# Patient Record
Sex: FEMALE | Race: WHITE
[De-identification: names, ages, dates, MRNs, and addresses within clinical notes are randomized per-mention and may not be internally consistent; named-entity substitution may affect disease eponyms.]

---

## 2017-01-02 ENCOUNTER — HOSPITAL ENCOUNTER (EMERGENCY)
Age: 62
Discharge: HOME | End: 2017-01-02

## 2017-07-28 ENCOUNTER — HOSPITAL ENCOUNTER (EMERGENCY)
Dept: HOSPITAL 76 - ED | Age: 62
Discharge: HOME | End: 2017-07-28
Payer: COMMERCIAL

## 2017-07-28 VITALS — SYSTOLIC BLOOD PRESSURE: 155 MMHG | DIASTOLIC BLOOD PRESSURE: 85 MMHG

## 2017-07-28 DIAGNOSIS — M75.102: Primary | ICD-10-CM
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
## 2017-07-28 PROCEDURE — 99281 EMR DPT VST MAYX REQ PHY/QHP: CPT

## 2017-07-28 PROCEDURE — 99283 EMERGENCY DEPT VISIT LOW MDM: CPT

## 2017-07-28 NOTE — ED PHYSICIAN DOCUMENTATION
PD HPI UPPER EXT INJURY





- Stated complaint


Stated Complaint: BACK PX/L ARM PX





- Chief complaint


Chief Complaint: Back Pain





- History obtained from


History obtained from: Patient





- History of Present Illness


Location: Left, Shoulder (and scapular area)


Type of injury: No: Fall, Twist (she was doing repetitive motion of the arms 

with lifting and tool work. Had onset of pain after and got worse. Seen by PMD 

and had some trigger point injections yesterday but hurting as much again today.

), Blunt / blow


Timing - onset: How many days ago (2-3)


Timing - duration: Days


Timing - details: Gradual onset, Still present


Improved by: Rest


Worsened by: Moving


Associated symptoms: No: Weakness, Numbness, Swelling


Similar symptoms before: Has not had sx before


Recently seen: Clinic (yesterday)





Review of Systems


Constitutional: denies: Fever, Chills


Skin: denies: Rash, Lesions


Neurologic: denies: Focal weakness, Numbness





PD PAST MEDICAL HISTORY





- Past Medical History


Cardiovascular: None


Respiratory: None


Neuro: Headache/migraine





- Past Surgical History


Past Surgical History: No





- Present Medications


Home Medications: 


 Ambulatory Orders











 Medication  Instructions  Recorded  Confirmed


 


HYDROcod/ACETAM 5/325 [Norco 5/325] 1 tab PO Q6H PRN #20 tablet 07/28/17 


 


Methocarbamol [Robaxin] 500 mg PO Q6H PRN #25 tablet 07/28/17 


 


Naproxen 375 mg PO BID #20 tablet 07/28/17 














- Allergies


Allergies/Adverse Reactions: 


 Allergies











Allergy/AdvReac Type Severity Reaction Status Date / Time


 


No Known Drug Allergies Allergy   Verified 01/02/17 16:08














- Social History


Does the pt smoke?: No


Smoking Status: Never smoker


Does the pt drink ETOH?: Yes


Does the pt have substance abuse?: Yes





PD ED PE NORMAL





- Vitals


Vital signs reviewed: Yes





- General


General: Alert and oriented X 3, Well developed/nourished, Other (appears 

uncomfortable and guarding motion of the left arm; has sling on from PMD. )





- Neck


Neck: Supple, no meningeal sign, No bony TTP





- Derm


Derm: Normal color, Warm and dry, No rash





- Extremities


Extremities: Other (left suprascapular area tender in muscles, worse with ROM 

of the shoulder. )





- Neuro


Neuro: No motor deficit, No sensory deficit





Results





- Vitals


Vitals: 


 Vital Signs - 24 hr











  07/28/17





  10:10


 


Temperature 36.6 C


 


Heart Rate 86


 


Respiratory 20





Rate 


 


Blood Pressure 155/85 H


 


O2 Saturation 98








 Oxygen











O2 Source                      Room air

















PD MEDICAL DECISION MAKING





- ED course


Complexity details: considered differential (no abrupt injury nor direct 

impact. Pain in suprascapular area mostly, but hurts with ROM of the shoulder 

suggesting rotator cuff muscles. ), d/w patient





Departure





- Departure


Disposition: 01 Home, Self Care


Clinical Impression: 


Rotator cuff tendonitis


Qualifiers:


 Laterality: left Qualified Code(s): M75.82 - Other shoulder lesions, left 

shoulder


Condition: Stable


Record reviewed to determine appropriate education?: Yes


Instructions:  ED Tendinitis Rotator Cuff


Prescriptions: 


Naproxen 375 mg PO BID #20 tablet


HYDROcod/ACETAM 5/325 [Norco 5/325] 1 tab PO Q6H PRN #20 tablet


 PRN Reason: Pain


Methocarbamol [Robaxin] 500 mg PO Q6H PRN #25 tablet


 PRN Reason: Spasms


Comments: 


The rotator cuff/shoulder muscles originate in the scapular area.  It does 

sound like you irritated the muscle and tendons with the activity as you have 

suggested.  Less use of the shoulder while it is healing.  You can use a sling 

periodically but do range of motion of the shoulder so does not stiffen up.  

Use naproxen twice daily for 7-10 days.  Add Robaxin if needed for muscle 

spasms and stiffness.  Tylenol or hydrocodone if needed for pain.  Follow-up 

with your primary care if not better over the next several days to week.


Discharge Date/Time: 07/28/17 11:12

## 2019-11-28 ENCOUNTER — HOSPITAL ENCOUNTER (EMERGENCY)
Dept: HOSPITAL 76 - ED | Age: 64
Discharge: HOME | End: 2019-11-28
Payer: COMMERCIAL

## 2019-11-28 VITALS — DIASTOLIC BLOOD PRESSURE: 77 MMHG | SYSTOLIC BLOOD PRESSURE: 145 MMHG

## 2019-11-28 DIAGNOSIS — M43.6: Primary | ICD-10-CM

## 2019-11-28 PROCEDURE — 96372 THER/PROPH/DIAG INJ SC/IM: CPT

## 2019-11-28 PROCEDURE — 99283 EMERGENCY DEPT VISIT LOW MDM: CPT

## 2019-11-28 NOTE — ED PHYSICIAN DOCUMENTATION
PD HPI NECK PAIN





- Stated complaint


Stated Complaint: NECK PX





- Chief complaint


Chief Complaint: Back Pain





- History obtained from


History obtained from: Patient





- History of Present Illness


Timing - onset: Today (awoke with neck pain and stiffness this morning, and has 

gotten worse with spasm through the day. No noted injury. Had similar about 2 

months ago, Rx with muscle relaxant, NSAIDs and mostly PT. Had similar about 2 

months prior to that, with PT for that as well, and had improved both times.)


Timing - details: Abrupt onset, Still present


Location: Mid, Lower, Right


Quality: Pain, Spasm


Associated symptoms: No: Fever, Weakness, Numbness


Similar symptoms before: Diagnosis (neck muscle spasms couple times in past few 

months, Rx with PT and antispasmodics and improved. Then recurs without injury.)





Review of Systems


Constitutional: denies: Fever, Chills


Nose: denies: Rhinorrhea / runny nose, Congestion


Throat: denies: Sore throat


Respiratory: denies: Cough


Neurologic: denies: Focal weakness, Numbness, Altered mental status, Headache





PD PAST MEDICAL HISTORY





- Past Medical History


Cardiovascular: None


Respiratory: None





- Past Surgical History


Past Surgical History: No





- Present Medications


Home Medications: 


                                Ambulatory Orders











 Medication  Instructions  Recorded  Confirmed


 


HYDROcod/ACETAM 5/325 [Norco 5/325] 1 tab PO Q6H PRN #20 tablet 07/28/17 


 


Methocarbamol [Robaxin] 500 mg PO Q6H PRN #25 tablet 07/28/17 


 


Naproxen 375 mg PO BID #20 tablet 07/28/17 


 


Hydrocodone/Acetaminophen [Norco 1 each PO Q6H PRN #15 tablet 11/28/19 





5-325 Tablet]   


 


Naproxen 375 mg PO BID #20 tablet 11/28/19 


 


Tizanidine HCl 4 mg PO TID PRN #25 capsule 11/28/19 


 


dexAMETHasone [Decadron] 4 mg PO DAILY #7 tablet 11/28/19 














- Allergies


Allergies/Adverse Reactions: 


                                    Allergies











Allergy/AdvReac Type Severity Reaction Status Date / Time


 


No Known Drug Allergies Allergy   Verified 11/28/19 15:28














- Social History


Does the pt smoke?: No


Smoking Status: Never smoker


Does the pt drink ETOH?: Yes


Does the pt have substance abuse?: Yes





PD ED PE NORMAL





- Vitals


Vital signs reviewed: Yes





- General


General: Alert and oriented X 3, Well developed/nourished, Other (appears 

uncomfortable with holding neck stiffly. Guarded ROM. )





- Neck


Neck: No bony TTP, Other (tender right trapezius muscle line. No rash nor sores.

 Not tender for light touch. )





- Derm


Derm: Normal color, Warm and dry





- Neuro


Neuro: Alert and oriented X 3, No motor deficit, No sensory deficit, Normal 

speech





Results





- Vitals


Vitals: 


                               Vital Signs - 24 hr











  11/28/19





  15:23


 


Temperature 36.4 C L


 


Heart Rate 84


 


Respiratory 19





Rate 


 


Blood Pressure 145/77 H


 


O2 Saturation 100








                                     Oxygen











O2 Source                      Room air

















PD MEDICAL DECISION MAKING





- ED course


Complexity details: considered differential (seems muscular and has marked 

tenderness along trapezius line with several trigger points of tenderness. These

 I injected (4 spots) with Lidocaine with epi with considerable improvement in 

ROM of the neck. ), d/w patient





Departure





- Departure


Disposition: 01 Home, Self Care


Clinical Impression: 


 Acute muscle stiffness of neck





Condition: Stable


Record reviewed to determine appropriate education?: Yes


Instructions:  ED Neck Back Pain General


Follow-Up: 


Erik Silva ND [Primary Care Provider] - 


Prescriptions: 


dexAMETHasone [Decadron] 4 mg PO DAILY #7 tablet


Hydrocodone/Acetaminophen [Norco 5-325 Tablet] 1 each PO Q6H PRN #15 tablet


 PRN Reason: Pain


Naproxen 375 mg PO BID #20 tablet


Tizanidine HCl 4 mg PO TID PRN #25 capsule


 PRN Reason: Spasms


Comments: 


Heat and gentle stretching as you have done with therapy in the past.  Use some 

anti-inflammatories such as naproxen twice daily with food for the next 7 to 10 

days.  Also add Decadron steroid anti-inflammatory daily for the next several 

days.





You can use muscle relaxant tizanidine if needed for stiffness and spasm.





Add Tylenol 500 to 650 mg 3 or 4 times a day or hydrocodone if needed for pains.





Follow-up with your primary care regarding other potential treatments and 

perhaps physical therapy again.


Discharge Date/Time: 11/28/19 17:06

## 2020-06-28 ENCOUNTER — HOSPITAL ENCOUNTER (OUTPATIENT)
Dept: HOSPITAL 76 - DI.S | Age: 65
Discharge: HOME | End: 2020-06-28
Attending: PHYSICIAN ASSISTANT
Payer: COMMERCIAL

## 2020-06-28 DIAGNOSIS — M18.11: Primary | ICD-10-CM

## 2020-06-28 PROCEDURE — 73140 X-RAY EXAM OF FINGER(S): CPT

## 2020-06-28 NOTE — XRAY REPORT
PROCEDURE:  Finger(s) RT

 

INDICATIONS:  RIGHT THUMB INJURY

 

TECHNIQUE:  AP hand, 3 views of the right thumb acquired.  

 

COMPARISON:  None

 

FINDINGS:  

 

Bones:  No fractures or dislocations.  No suspicious bony lesions.  Degenerative change involving the
 first carpometacarpal, first MCP, and first IP joints.

 

Soft tissues:  No suspicious soft tissue calcifications.  

 

IMPRESSION:  

No evidence acute bony abnormality of the right thumb. Degenerative change.

 

Reviewed by: Anthony Fay MD on 6/28/2020 1:34 PM [facility]

Approved by: Anthony Fay MD on 6/28/2020 1:34 PM [facility]

 

 

Station ID:  [id number]

## 2021-03-09 ENCOUNTER — HOSPITAL ENCOUNTER (EMERGENCY)
Dept: HOSPITAL 76 - ED | Age: 66
Discharge: HOME | End: 2021-03-09
Payer: COMMERCIAL

## 2021-03-09 VITALS — SYSTOLIC BLOOD PRESSURE: 126 MMHG | DIASTOLIC BLOOD PRESSURE: 77 MMHG
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
## 2021-03-09 DIAGNOSIS — F17.200: ICD-10-CM

## 2021-03-09 DIAGNOSIS — M54.5: Primary | ICD-10-CM

## 2021-03-09 DIAGNOSIS — M62.830: ICD-10-CM

## 2021-03-09 DIAGNOSIS — G89.29: ICD-10-CM
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
## 2021-03-09 PROCEDURE — 99284 EMERGENCY DEPT VISIT MOD MDM: CPT

## 2021-03-09 PROCEDURE — 96372 THER/PROPH/DIAG INJ SC/IM: CPT

## 2021-03-09 PROCEDURE — 99283 EMERGENCY DEPT VISIT LOW MDM: CPT

## 2021-03-09 NOTE — ED PHYSICIAN DOCUMENTATION
History of Present Illness





- Stated complaint


Stated Complaint: BACK PX





- Chief complaint


Chief Complaint: Back Pain





- Additonal information


Additional information: 


65-year-old female presents emergency department with acute back pain for about 

the last 24 hours.  She reports that 2 to 3 days ago she was doing a lot of 

outdoor yard work including shoveling heavy gravel as well as lifting heavy 

boxes.  She had some soreness but last night began to have some acute pain in 

the low back with what she feels are spasms that radiate across both sides.  

Denies any saddle anesthesia bowel or bladder incontinence.  No fevers.  No 

history of spinal surgery or instrumentation.  She does have a history of low 

back pain.  She reports that about 3 years ago she had an MRI of the lumbar 

spine completed through the [facility] and it did show lumbar disc 

herniation.





Over the last 24 hours patient has taken naproxen 3 times without relief of 

pain.








Review of Systems


Constitutional: denies: Fever, Chills


Eyes: reports: Reviewed and negative


Ears: reports: Reviewed and negative


Nose: denies: Rhinorrhea / runny nose, Congestion, Epistaxis, Sinus pressure / 

pain, Foreign Body, Reviewed and negative, Other


Throat: reports: Reviewed and negative


Cardiac: reports: Reviewed and negative


Respiratory: reports: Reviewed and negative


GI: reports: Reviewed and negative


: denies: Dysuria, Frequency, Hesitancy, Unable to Void


Skin: denies: Rash, Lesions


Musculoskeletal: reports: Back pain.  denies: Joint pain, Extremity swelling


Neurologic: denies: Generalized weakness, Focal weakness, Numbness





PD PAST MEDICAL HISTORY





- Past Medical History


Past Medical History: No


Cardiovascular: None


Respiratory: None





- Past Surgical History


Past Surgical History: Yes





- Present Medications


Home Medications: 


                                Ambulatory Orders











 Medication  Instructions  Recorded  Confirmed


 


Ibuprofen [Motrin] 600 mg PO Q6H PRN #30 tab 03/09/21 


 


Methocarbamol [Robaxin-750] 750 mg PO TID PRN #30 tablet 03/09/21 


 


Oxycodone HCl/Acetaminophen 1 - 2 each PO Q6H PRN #14 tablet 03/09/21 





[Percocet 5-325 mg Tablet]   














- Allergies


Allergies/Adverse Reactions: 


                                    Allergies











Allergy/AdvReac Type Severity Reaction Status Date / Time


 


No Known Drug Allergies Allergy   Verified 11/28/19 15:28














- Social History


Does the pt smoke?: Yes


Smoking Status: Current every day smoker


Does the pt drink ETOH?: Yes


Does the pt have substance abuse?: Yes


Substance Use and Type: Marijuana





- Immunizations


Immunizations are current?: Yes





- POLST


Patient has POLST: No





PD ED PE EXPANDED





- General


General: Alert, No acute distress, Well developed/nourished





- Neck


Neck: Supple w/out meningeal sx





- Cardiac


Cardiac: Regular Rate, Regular Rhythm, Radial strong equal, Pedal strong equal, 

Cap refill < 2 sec





- Respiratory


Respiratory: Clear to ausultation carl.  No: Distress, Labored





- Back


Back: Soft tissue tenderness (midline, though no spinous process tenderness that

radiates arcoss the lower lumbar region.  Motor strength 5/5 BLE.  no 

parathesias. ).  No: Vertebral tenderness, CVA TTP right, CVA TTP left





- Extremities


Extremities: Normal.  No: Deformity, Tenderness, Pedal edema bilateral





- Neuro


Neuro: Alert and Oriented X 3, CNII-XII intact





- GCS


Eye Opening: Spontaneous


Motor: Obeys Commands


Verbal: Oriented


Total: 15





Results





- Vitals


Vitals: 


                               Vital Signs - 24 hr











  03/09/21 03/09/21





  18:00 18:25


 


Temperature 36.5 C 


 


Heart Rate 80 81


 


Respiratory 16 18





Rate  


 


Blood Pressure 145/100 H 146/83 H


 


O2 Saturation 96 100








                                     Oxygen











O2 Source                      Room air

















PD MEDICAL DECISION MAKING





- ED course


Complexity details: re-evaluated patient, considered differential, d/w patient


ED course: 


65-year-old female presents to the emergency department with mechanical low back

pain after heavy lifting and shoveling this weekend.  She does report a history 

of disc herniation in the lumbar spine.  Clinically she has no red flags with 

the exception of her age.  She did report that she felt a large amount of spasm 

in both sides of the lumbar spine.





On presentation she was given 5 mg of oxycodone as well as 5 mg intramuscularly 

of Valium.  On repeat evaluation she feels that her pain has nearly fully 

subsided she is able to sit up without assistance and walk in the room with only

a mildly antalgic gait.





I suspect that this is an acute on chronic presentation.  Patient will be 

prescribed a limited amount of Percocet as well as a muscle relaxer.  I will 

also recommend a short course of ibuprofen.  Primary care is through the [facility] and I have advised that she follow-up within the next week.  If symptoms 

not markedly better she may need repeat MRI or referral to back pain 

specialist/physiatry.








Departure





- Departure


Disposition: 01 Home, Self Care


Clinical Impression: 


Low back pain


Qualifiers:


 Chronicity: unspecified Back pain laterality: bilateral Sciatica presence: 

without sciatica Qualified Code(s): M54.5 - Low back pain





Condition: Stable


Record reviewed to determine appropriate education?: Yes


Instructions:  ED Sprain Strain Lumbar, ED Spasm Back No Trauma


Prescriptions: 


Ibuprofen [Motrin] 600 mg PO Q6H PRN #30 tab


 PRN Reason: Pain


Oxycodone HCl/Acetaminophen [Percocet 5-325 mg Tablet] 1 - 2 each PO Q6H PRN #14

tablet


 PRN Reason: pain


Methocarbamol [Robaxin-750] 750 mg PO TID PRN #30 tablet


 PRN Reason: Spasms


Comments: 


You were seen in the emergency department for low back pain after heavy lifting 

and garden work this weekend.  On exam he had a fair amount of spasm in the low 

back which is likely what caused your pain.  We were able to get moderate pain 

control by using Valium as well as a small amount of Percocet.





I would like you to take the muscle relaxer with food 2-3 times a day.  Please 

be cautious it may make you dizzy unsafe to drive and prone to falls.  For 

severe pain please use the Percocet.  Please be careful with this medication it 

also makes you unsafe to drive and can cause constipation as well as being 

highly addictive.





I would recommend a warm compress to help relax the muscles as well as 

ibuprofen.





Please schedule follow-up with your primary care provider.  If pain is not 

markedly better over the next 10 days to 2 weeks you may benefit from referral 

to physiatry and/or repeat MRI.